# Patient Record
Sex: MALE | Race: OTHER | NOT HISPANIC OR LATINO | URBAN - METROPOLITAN AREA
[De-identification: names, ages, dates, MRNs, and addresses within clinical notes are randomized per-mention and may not be internally consistent; named-entity substitution may affect disease eponyms.]

---

## 2020-05-28 ENCOUNTER — APPOINTMENT (RX ONLY)
Dept: URBAN - METROPOLITAN AREA CLINIC 162 | Facility: CLINIC | Age: 65
Setting detail: DERMATOLOGY
End: 2020-05-28

## 2020-05-28 DIAGNOSIS — L57.0 ACTINIC KERATOSIS: ICD-10-CM

## 2020-05-28 DIAGNOSIS — L57.8 OTHER SKIN CHANGES DUE TO CHRONIC EXPOSURE TO NONIONIZING RADIATION: ICD-10-CM

## 2020-05-28 DIAGNOSIS — D485 NEOPLASM OF UNCERTAIN BEHAVIOR OF SKIN: ICD-10-CM

## 2020-05-28 DIAGNOSIS — Z71.89 OTHER SPECIFIED COUNSELING: ICD-10-CM

## 2020-05-28 DIAGNOSIS — L91.8 OTHER HYPERTROPHIC DISORDERS OF THE SKIN: ICD-10-CM

## 2020-05-28 DIAGNOSIS — D18.0 HEMANGIOMA: ICD-10-CM

## 2020-05-28 DIAGNOSIS — L24 IRRITANT CONTACT DERMATITIS: ICD-10-CM

## 2020-05-28 PROBLEM — L24.9 IRRITANT CONTACT DERMATITIS, UNSPECIFIED CAUSE: Status: ACTIVE | Noted: 2020-05-28

## 2020-05-28 PROBLEM — D18.01 HEMANGIOMA OF SKIN AND SUBCUTANEOUS TISSUE: Status: ACTIVE | Noted: 2020-05-28

## 2020-05-28 PROBLEM — D48.5 NEOPLASM OF UNCERTAIN BEHAVIOR OF SKIN: Status: ACTIVE | Noted: 2020-05-28

## 2020-05-28 PROCEDURE — 99202 OFFICE O/P NEW SF 15 MIN: CPT | Mod: 25

## 2020-05-28 PROCEDURE — ? FULL BODY SKIN EXAM

## 2020-05-28 PROCEDURE — ? ADDITIONAL NOTES

## 2020-05-28 PROCEDURE — 11102 TANGNTL BX SKIN SINGLE LES: CPT

## 2020-05-28 PROCEDURE — 17003 DESTRUCT PREMALG LES 2-14: CPT

## 2020-05-28 PROCEDURE — ? BIOPSY BY SHAVE METHOD

## 2020-05-28 PROCEDURE — 17000 DESTRUCT PREMALG LESION: CPT | Mod: 59

## 2020-05-28 PROCEDURE — ? PRESCRIPTION

## 2020-05-28 PROCEDURE — ? LIQUID NITROGEN (CM)

## 2020-05-28 PROCEDURE — ? COUNSELING

## 2020-05-28 RX ORDER — TRIAMCINOLONE ACETONIDE 1 MG/G
OINTMENT TOPICAL
Qty: 1 | Refills: 1 | Status: ERX | COMMUNITY
Start: 2020-05-28

## 2020-05-28 RX ADMIN — TRIAMCINOLONE ACETONIDE: 1 OINTMENT TOPICAL at 00:00

## 2020-05-28 ASSESSMENT — LOCATION DETAILED DESCRIPTION DERM
LOCATION DETAILED: LEFT POSTERIOR SHOULDER
LOCATION DETAILED: LEFT SUPERIOR PARIETAL SCALP
LOCATION DETAILED: LEFT AXILLARY VAULT
LOCATION DETAILED: RIGHT SUPERIOR LATERAL LOWER BACK
LOCATION DETAILED: RIGHT SUPERIOR PARIETAL SCALP
LOCATION DETAILED: RIGHT ELBOW
LOCATION DETAILED: RIGHT AXILLARY VAULT

## 2020-05-28 ASSESSMENT — LOCATION SIMPLE DESCRIPTION DERM
LOCATION SIMPLE: LEFT SHOULDER
LOCATION SIMPLE: SCALP
LOCATION SIMPLE: LEFT AXILLARY VAULT
LOCATION SIMPLE: RIGHT LOWER BACK
LOCATION SIMPLE: RIGHT AXILLARY VAULT
LOCATION SIMPLE: RIGHT ELBOW

## 2020-05-28 ASSESSMENT — LOCATION ZONE DERM
LOCATION ZONE: SCALP
LOCATION ZONE: ARM
LOCATION ZONE: AXILLAE
LOCATION ZONE: TRUNK

## 2021-09-03 ENCOUNTER — APPOINTMENT (RX ONLY)
Dept: URBAN - METROPOLITAN AREA CLINIC 162 | Facility: CLINIC | Age: 66
Setting detail: DERMATOLOGY
End: 2021-09-03

## 2021-09-03 DIAGNOSIS — D22 MELANOCYTIC NEVI: ICD-10-CM

## 2021-09-03 DIAGNOSIS — L85.3 XEROSIS CUTIS: ICD-10-CM

## 2021-09-03 DIAGNOSIS — L82.1 OTHER SEBORRHEIC KERATOSIS: ICD-10-CM

## 2021-09-03 DIAGNOSIS — L57.8 OTHER SKIN CHANGES DUE TO CHRONIC EXPOSURE TO NONIONIZING RADIATION: ICD-10-CM

## 2021-09-03 DIAGNOSIS — L91.8 OTHER HYPERTROPHIC DISORDERS OF THE SKIN: ICD-10-CM

## 2021-09-03 DIAGNOSIS — L72.0 EPIDERMAL CYST: ICD-10-CM

## 2021-09-03 DIAGNOSIS — Z71.89 OTHER SPECIFIED COUNSELING: ICD-10-CM

## 2021-09-03 DIAGNOSIS — D18.0 HEMANGIOMA: ICD-10-CM

## 2021-09-03 PROBLEM — D22.9 MELANOCYTIC NEVI, UNSPECIFIED: Status: ACTIVE | Noted: 2021-09-03

## 2021-09-03 PROBLEM — D48.5 NEOPLASM OF UNCERTAIN BEHAVIOR OF SKIN: Status: ACTIVE | Noted: 2021-09-03

## 2021-09-03 PROBLEM — D18.01 HEMANGIOMA OF SKIN AND SUBCUTANEOUS TISSUE: Status: ACTIVE | Noted: 2021-09-03

## 2021-09-03 PROCEDURE — ? BIOPSY BY SHAVE METHOD

## 2021-09-03 PROCEDURE — ? COUNSELING

## 2021-09-03 PROCEDURE — 99213 OFFICE O/P EST LOW 20 MIN: CPT | Mod: 25

## 2021-09-03 PROCEDURE — ? TREATMENT REGIMEN

## 2021-09-03 PROCEDURE — 11102 TANGNTL BX SKIN SINGLE LES: CPT

## 2021-09-03 PROCEDURE — ? FULL BODY SKIN EXAM

## 2021-09-03 PROCEDURE — ? ADDITIONAL NOTES

## 2021-09-03 ASSESSMENT — LOCATION DETAILED DESCRIPTION DERM
LOCATION DETAILED: LEFT CLAVICULAR NECK
LOCATION DETAILED: LEFT BUTTOCK
LOCATION DETAILED: LEFT SUPERIOR PARIETAL SCALP
LOCATION DETAILED: LEFT LATERAL CANTHUS

## 2021-09-03 ASSESSMENT — LOCATION ZONE DERM
LOCATION ZONE: SCALP
LOCATION ZONE: EYELID
LOCATION ZONE: NECK
LOCATION ZONE: TRUNK

## 2021-09-03 ASSESSMENT — LOCATION SIMPLE DESCRIPTION DERM
LOCATION SIMPLE: LEFT ANTERIOR NECK
LOCATION SIMPLE: SCALP
LOCATION SIMPLE: LEFT EYELID
LOCATION SIMPLE: LEFT BUTTOCK

## 2021-09-03 NOTE — PROCEDURE: TREATMENT REGIMEN
Initiate Treatment: Apply SPF 30 or greater daily, reapply q 4 hrs.
Detail Level: Simple
Plan: Apply aquaphor to clean wet skin, then dry off.
Initiate Treatment: Apply Aquaphor to wet skin

## 2021-09-03 NOTE — PROCEDURE: BIOPSY BY SHAVE METHOD
21        Gavin Fernandez  [address] [facility] [address]          Patient:  Gavin Fernandez : 1971  MRN#: [id number]      Dear Gavin:    We have been unable to reach you by phone.  Please contact the office at [phone] at your earliest convenience to discuss scheduling your screening colonoscopy.      Sincerely,          Sonia Duncan MD  [facility]  [facility]  [address] [place] [address]   Detail Level: Detailed